# Patient Record
Sex: MALE | Race: OTHER | Employment: FULL TIME | URBAN - METROPOLITAN AREA
[De-identification: names, ages, dates, MRNs, and addresses within clinical notes are randomized per-mention and may not be internally consistent; named-entity substitution may affect disease eponyms.]

---

## 2020-02-12 ENCOUNTER — OFFICE VISIT (OUTPATIENT)
Dept: URGENT CARE | Facility: CLINIC | Age: 40
End: 2020-02-12
Payer: COMMERCIAL

## 2020-02-12 VITALS
WEIGHT: 230 LBS | RESPIRATION RATE: 19 BRPM | BODY MASS INDEX: 27.16 KG/M2 | SYSTOLIC BLOOD PRESSURE: 142 MMHG | TEMPERATURE: 102 F | HEIGHT: 77 IN | HEART RATE: 104 BPM | DIASTOLIC BLOOD PRESSURE: 88 MMHG | OXYGEN SATURATION: 97 %

## 2020-02-12 DIAGNOSIS — J11.1 INFLUENZA: Primary | ICD-10-CM

## 2020-02-12 DIAGNOSIS — R50.9 FEVER, UNSPECIFIED FEVER CAUSE: ICD-10-CM

## 2020-02-12 DIAGNOSIS — H66.90 OTITIS MEDIA, UNSPECIFIED LATERALITY, UNSPECIFIED OTITIS MEDIA TYPE: ICD-10-CM

## 2020-02-12 LAB
CTP QC/QA: YES
FLUAV AG NPH QL: POSITIVE
FLUBV AG NPH QL: NEGATIVE

## 2020-02-12 PROCEDURE — 99204 PR OFFICE/OUTPT VISIT, NEW, LEVL IV, 45-59 MIN: ICD-10-PCS | Mod: 25,S$GLB,, | Performed by: NURSE PRACTITIONER

## 2020-02-12 PROCEDURE — 99204 OFFICE O/P NEW MOD 45 MIN: CPT | Mod: 25,S$GLB,, | Performed by: NURSE PRACTITIONER

## 2020-02-12 PROCEDURE — 87804 POCT INFLUENZA A/B: ICD-10-PCS | Mod: 59,QW,S$GLB, | Performed by: NURSE PRACTITIONER

## 2020-02-12 PROCEDURE — 87804 INFLUENZA ASSAY W/OPTIC: CPT | Mod: 59,QW,S$GLB, | Performed by: NURSE PRACTITIONER

## 2020-02-12 RX ORDER — CETIRIZINE HYDROCHLORIDE 10 MG/1
10 TABLET ORAL DAILY
COMMUNITY

## 2020-02-12 RX ORDER — PROMETHAZINE HYDROCHLORIDE AND DEXTROMETHORPHAN HYDROBROMIDE 6.25; 15 MG/5ML; MG/5ML
5 SYRUP ORAL
Qty: 118 ML | Refills: 0 | Status: SHIPPED | OUTPATIENT
Start: 2020-02-12 | End: 2020-02-22

## 2020-02-12 RX ORDER — ALBUTEROL SULFATE 90 UG/1
2 AEROSOL, METERED RESPIRATORY (INHALATION) EVERY 6 HOURS PRN
Qty: 6.7 G | Refills: 0 | Status: SHIPPED | OUTPATIENT
Start: 2020-02-12

## 2020-02-12 RX ORDER — IBUPROFEN 200 MG
600 TABLET ORAL
Status: COMPLETED | OUTPATIENT
Start: 2020-02-12 | End: 2020-02-12

## 2020-02-12 RX ORDER — OSELTAMIVIR PHOSPHATE 75 MG/1
75 CAPSULE ORAL 2 TIMES DAILY
Qty: 10 CAPSULE | Refills: 0 | Status: SHIPPED | OUTPATIENT
Start: 2020-02-12 | End: 2020-02-17

## 2020-02-12 RX ORDER — AZITHROMYCIN 250 MG/1
TABLET, FILM COATED ORAL
Qty: 6 TABLET | Refills: 0 | Status: SHIPPED | OUTPATIENT
Start: 2020-02-12

## 2020-02-12 RX ORDER — BENZONATATE 200 MG/1
200 CAPSULE ORAL 3 TIMES DAILY PRN
Qty: 20 CAPSULE | Refills: 0 | Status: SHIPPED | OUTPATIENT
Start: 2020-02-12 | End: 2020-02-22

## 2020-02-12 RX ADMIN — Medication 600 MG: at 01:02

## 2020-02-12 NOTE — PATIENT INSTRUCTIONS
Influenza (Adult)    Influenza is also called the flu. It is a viral illness that affects the air passages of your lungs. It is different from the common cold. The flu can easily be passed from one to person to another. It may be spread through the air by coughing and sneezing. Or it can be spread by touching the sick person and then touching your own eyes, nose, or mouth.  The flu starts 1 to 3 days after you are exposed to the flu virus. It may last for 1 to 2 weeks but many people feel tired or fatigued for many weeks afterward. You usually dont need to take antibiotics unless you have a complication. This might be an ear or sinus infection or pneumonia.  Symptoms of the flu may be mild or severe. They can include extreme tiredness (wanting to stay in bed all day), chills, fevers, muscle aches, soreness with eye movement, headache, and a dry, hacking cough.  Home care  Follow these guidelines when caring for yourself at home:  · Avoid being around cigarette smoke, whether yours or other peoples.  · Acetaminophen or ibuprofen will help ease your fever, muscle aches, and headache. Dont give aspirin to anyone younger than 18 who has the flu. Aspirin can harm the liver.  · Nausea and loss of appetite are common with the flu. Eat light meals. Drink 6 to 8 glasses of liquids every day. Good choices are water, sport drinks, soft drinks without caffeine, juices, tea, and soup. Extra fluids will also help loosen secretions in your nose and lungs.  · Over-the-counter cold medicines will not make the flu go away faster. But the medicines may help with coughing, sore throat, and congestion in your nose and sinuses. Dont use a decongestant if you have high blood pressure.  · Stay home until your fever has been gone for at least 24 hours without using medicine to reduce fever.  Follow-up care  Follow up with your healthcare provider, or as advised, if you are not getting better over the next week.  If you are age 65 or  older, talk with your provider about getting a pneumococcal vaccine every 5 years. You should also get this vaccine if you have chronic asthma or COPD. All adults should get a flu vaccine every fall. Ask your provider about this.  When to seek medical advice  Call your healthcare provider right away if any of these occur:  · Cough with lots of colored mucus (sputum) or blood in your mucus  · Chest pain, shortness of breath, wheezing, or trouble breathing  · Severe headache, or face, neck, or ear pain  · New rash with fever  · Fever of 100.4°F (38°C) or higher, or as directed by your healthcare provider  · Confusion, behavior change, or seizure  · Severe weakness or dizziness  · You get a new fever or cough after getting better for a few days  Date Last Reviewed: 1/1/2017 © 2000-2017 [employer]. [address] [place] [id number]. All rights reserved. This information is not intended as a substitute for professional medical care. Always follow your healthcare professional's instructions.        Urgent Care Management:  - Treatment plan discussed.  - PCP recommendations given.  - Return precautions advised.  - Patient agrees with and understands plan of care.    Patient Instructions, Education, Teaching and Summary of Visit:      RETURN TO CLINIC IF SYMPTOMS WORSEN OR CALL 911 IMMEDIATELY FOR SHORTNESS OF BREATH, CHEST PAIN, DIZZINESS, WORSENING PAIN, NAUSEA AND VOMITING, HEART PALPITATIONS, FEVER AND/OR NECK STIFFNESS. FOLLOW UP WITH PRIMARY CARE PROVIDER IN THE AM.    -Diagnosis and treatment plan discussed with patient.  -Patient agreed with my treatment plan.  -Patient will follow up with primary care provider or Specialty Provider, as discussed.     -If you were prescribed a narcotic or controlled medication, do not drive or operate heavy equipment or machinery while taking these medications.  -You must understand that you've received an Urgent Care treatment only and that you may be  released before all your medical problems are known or treated.   -You, the patient, will arrange for follow up care as instructed.  -Follow up with your PCP or specialty clinic as directed in the next 1-2 weeks if not improved or as needed.    -You can call [phone] to schedule an appointment with the appropriate provider.  -If your condition worsens we recommend that you receive another evaluation at the emergency room immediately or contact your primary medical clinics after hours call service to discuss your concerns.  -Please return here or go to the Emergency Department for any concerns or worsening of condition.

## 2020-02-12 NOTE — PROGRESS NOTES
"Subjective:       Patient ID: Sebastián Da Silva is a 39 y.o. male.    Vitals:  height is 6' 5" (1.956 m) and weight is 104.3 kg (230 lb). His temperature is 102 °F (38.9 °C) (abnormal). His blood pressure is 142/88 (abnormal) and his pulse is 104. His respiration is 19 and oxygen saturation is 97%.     Chief Complaint: Influenza    Pt stated that he started to feel ill Saturday morning. His daughter was diagnosed with influenza a yesterday.    Influenza   This is a new problem. The current episode started in the past 7 days. The problem occurs constantly. The problem has been gradually worsening. Associated symptoms include chills, congestion, fatigue, a fever, myalgias and a rash. Pertinent negatives include no arthralgias, chest pain, coughing, headaches, joint swelling, nausea, sore throat, vertigo or vomiting. Nothing aggravates the symptoms. He has tried acetaminophen and NSAIDs for the symptoms. The treatment provided mild relief.       Constitution: Positive for chills, fatigue and fever.   HENT: Positive for congestion. Negative for sore throat.    Neck: Negative for painful lymph nodes.   Cardiovascular: Negative for chest pain and leg swelling.   Eyes: Negative for double vision and blurred vision.   Respiratory: Negative for cough and shortness of breath.    Gastrointestinal: Negative for nausea, vomiting and diarrhea.   Genitourinary: Negative for dysuria, frequency and urgency.   Musculoskeletal: Positive for muscle cramps and muscle ache. Negative for joint pain and joint swelling.   Skin: Positive for rash. Negative for color change and pale.   Allergic/Immunologic: Negative for seasonal allergies.   Neurological: Negative for dizziness, history of vertigo, light-headedness, passing out and headaches.   Hematologic/Lymphatic: Negative for swollen lymph nodes, easy bruising/bleeding and history of blood clots. Does not bruise/bleed easily.   Psychiatric/Behavioral: Negative for nervous/anxious, " sleep disturbance and depression. The patient is not nervous/anxious.        Objective:      Physical Exam   Constitutional: He is oriented to person, place, and time. He appears well-developed and well-nourished. He is cooperative.  Non-toxic appearance. He has a sickly appearance. He appears ill. No distress.   HENT:   Head: Normocephalic and atraumatic.   Right Ear: Hearing, external ear and ear canal normal. Tympanic membrane is injected and erythematous. A middle ear effusion is present.   Left Ear: Hearing, external ear and ear canal normal. Tympanic membrane is injected.   Nose: Mucosal edema and rhinorrhea present. No nasal deformity. No epistaxis. Right sinus exhibits no maxillary sinus tenderness and no frontal sinus tenderness. Left sinus exhibits no maxillary sinus tenderness and no frontal sinus tenderness.   Mouth/Throat: Uvula is midline and mucous membranes are normal. No trismus in the jaw. Normal dentition. No uvula swelling. Posterior oropharyngeal edema and posterior oropharyngeal erythema present. No oropharyngeal exudate.   Eyes: Pupils are equal, round, and reactive to light. Conjunctivae, EOM and lids are normal. No scleral icterus.   Neck: Trachea normal, normal range of motion, full passive range of motion without pain and phonation normal. Neck supple. No neck rigidity. No edema and no erythema present.   Cardiovascular: Normal rate, regular rhythm, normal heart sounds, intact distal pulses and normal pulses.   Pulmonary/Chest: Effort normal and breath sounds normal. No respiratory distress. He has no decreased breath sounds. He has no rhonchi.   Abdominal: Normal appearance.   Musculoskeletal: Normal range of motion. He exhibits no edema or deformity.   Neurological: He is alert and oriented to person, place, and time. He exhibits normal muscle tone. Coordination normal.   Skin: Skin is warm, dry, intact, not diaphoretic and not pale.   Psychiatric: He has a normal mood and affect. His  speech is normal and behavior is normal. Judgment and thought content normal. Cognition and memory are normal.   Nursing note and vitals reviewed.        Assessment:       1. Influenza    2. Fever, unspecified fever cause    3. Otitis media, unspecified laterality, unspecified otitis media type        Plan:         Influenza  -     oseltamivir (TAMIFLU) 75 MG capsule; Take 1 capsule (75 mg total) by mouth 2 (two) times daily. for 5 days  Dispense: 10 capsule; Refill: 0  -     azithromycin (ZITHROMAX Z-ANNA) 250 MG tablet; Take 2 tablets (500 mg) on  Day 1,  followed by 1 tablet (250 mg) once daily on Days 2 through 5.  Dispense: 6 tablet; Refill: 0  -     promethazine-dextromethorphan (PROMETHAZINE-DM) 6.25-15 mg/5 mL Syrp; Take 5 mLs by mouth every 4 to 6 hours as needed.  Dispense: 118 mL; Refill: 0  -     benzonatate (TESSALON) 200 MG capsule; Take 1 capsule (200 mg total) by mouth 3 (three) times daily as needed for Cough.  Dispense: 20 capsule; Refill: 0  -     albuterol (PROVENTIL/VENTOLIN HFA) 90 mcg/actuation inhaler; Inhale 2 puffs into the lungs every 6 (six) hours as needed for Wheezing. Cough and Shortness of Breath. Rescue  Dispense: 6.7 g; Refill: 0    Fever, unspecified fever cause  -     ibuprofen tablet 600 mg  -     POCT Influenza A/B    Otitis media, unspecified laterality, unspecified otitis media type

## 2020-02-17 ENCOUNTER — TELEPHONE (OUTPATIENT)
Dept: URGENT CARE | Facility: CLINIC | Age: 40
End: 2020-02-17